# Patient Record
Sex: MALE | Race: BLACK OR AFRICAN AMERICAN | NOT HISPANIC OR LATINO | URBAN - METROPOLITAN AREA
[De-identification: names, ages, dates, MRNs, and addresses within clinical notes are randomized per-mention and may not be internally consistent; named-entity substitution may affect disease eponyms.]

---

## 2024-08-19 ENCOUNTER — TELEPHONE (OUTPATIENT)
Dept: BEHAVIORAL HEALTH | Facility: CLINIC | Age: 54
End: 2024-08-19

## 2024-08-19 NOTE — TELEPHONE ENCOUNTER
"Pt is a(n) adult (20+ out of HS) Seeking as eval for Adult Mental Health DA for Programmatic Care - Program Preference? Yes: Day Treatment ([place]) .  Appointment scheduled by:  Patient.  (self-pay - complete Cost Estimate)  Caller name:  Brittany Nicholson    Caller phone #: [phone]  Legal Guardianship Reviewed?  No  Honoring Choices Notified?  No  Brief reason for appt:  Patient is interested in the mental health day treatment program offered at [facility].      needed?  NO    Contact information verified/updated: Yes    If appt is for adult [facility] program location, confirm you have verified the location and address with the patient referring to the template header.  N/A    Valencia Bowden    \"We have scheduled your evaluation. In the event that your insurance coverage comes back as out of network, you may receive a call to cancel your appointment and direct you to your insurance company for in-network coverage.\"    Disclaimer regarding insurance read to patient?  Yes  Informed patient [place] are for programming that is in person in the [place] area?  Yes - proceed with scheduling   "

## 2024-08-27 ENCOUNTER — VIRTUAL VISIT (OUTPATIENT)
Dept: BEHAVIORAL HEALTH | Facility: CLINIC | Age: 54
End: 2024-08-27
Payer: COMMERCIAL

## 2024-08-27 DIAGNOSIS — F34.1 PERSISTENT DEPRESSIVE DISORDER: Primary | ICD-10-CM

## 2024-08-27 PROCEDURE — 99207 PR NO CHARGE LOS: CPT | Mod: 95

## 2024-08-27 RX ORDER — AMLODIPINE BESYLATE 10 MG/1
20 TABLET ORAL DAILY
COMMUNITY

## 2024-08-27 RX ORDER — BUPROPION HYDROCHLORIDE 150 MG/1
150 TABLET ORAL EVERY MORNING
COMMUNITY

## 2024-08-27 RX ORDER — LISINOPRIL 20 MG/1
20 TABLET ORAL DAILY
COMMUNITY

## 2024-08-27 ASSESSMENT — PATIENT HEALTH QUESTIONNAIRE - PHQ9
10. IF YOU CHECKED OFF ANY PROBLEMS, HOW DIFFICULT HAVE THESE PROBLEMS MADE IT FOR YOU TO DO YOUR WORK, TAKE CARE OF THINGS AT HOME, OR GET ALONG WITH OTHER PEOPLE: VERY DIFFICULT
SUM OF ALL RESPONSES TO PHQ QUESTIONS 1-9: 19
SUM OF ALL RESPONSES TO PHQ QUESTIONS 1-9: 19

## 2024-08-27 ASSESSMENT — ANXIETY QUESTIONNAIRES
4. TROUBLE RELAXING: MORE THAN HALF THE DAYS
3. WORRYING TOO MUCH ABOUT DIFFERENT THINGS: NEARLY EVERY DAY
GAD7 TOTAL SCORE: 12
GAD7 TOTAL SCORE: 12
7. FEELING AFRAID AS IF SOMETHING AWFUL MIGHT HAPPEN: SEVERAL DAYS
IF YOU CHECKED OFF ANY PROBLEMS ON THIS QUESTIONNAIRE, HOW DIFFICULT HAVE THESE PROBLEMS MADE IT FOR YOU TO DO YOUR WORK, TAKE CARE OF THINGS AT HOME, OR GET ALONG WITH OTHER PEOPLE: SOMEWHAT DIFFICULT
2. NOT BEING ABLE TO STOP OR CONTROL WORRYING: MORE THAN HALF THE DAYS
5. BEING SO RESTLESS THAT IT IS HARD TO SIT STILL: SEVERAL DAYS
GAD7 TOTAL SCORE: 12
7. FEELING AFRAID AS IF SOMETHING AWFUL MIGHT HAPPEN: SEVERAL DAYS
1. FEELING NERVOUS, ANXIOUS, OR ON EDGE: MORE THAN HALF THE DAYS
8. IF YOU CHECKED OFF ANY PROBLEMS, HOW DIFFICULT HAVE THESE MADE IT FOR YOU TO DO YOUR WORK, TAKE CARE OF THINGS AT HOME, OR GET ALONG WITH OTHER PEOPLE?: SOMEWHAT DIFFICULT
6. BECOMING EASILY ANNOYED OR IRRITABLE: SEVERAL DAYS

## 2024-08-27 NOTE — PROGRESS NOTES
"    [facility]         PATIENT'S NAME: Brittany Nicholson  PREFERRED NAME: Brittany  PRONOUNS:   he/him    MRN: [id number]  : 1970  ADDRESS: [address] [place] [address] [address] [place] [id number]  ACCT. NUMBER:  [id number]  DATE OF SERVICE: 24  START TIME: 1000  END TIME: 1115  75 minutes  PREFERRED PHONE: [phone]  May we leave a program related message: Yes  EMERGENCY CONTACT: was obtained Mikhail Dominguezun (Cousin)  [phone] (Mobile)   SERVICE MODALITY:  Video Visit:      Provider verified identity through the following two step process.  Patient provided:  Patient  and Patient address    Telemedicine Visit: The patient's condition can be safely assessed and treated via synchronous audio and visual telemedicine encounter.      Reason for Telemedicine Visit: Patient convenience (e.g. access to timely appointments / distance to available provider)    Originating Site (Patient Location): Patient's home    Distant Site (Provider Location): [facility] [place]    Consent:  The patient/guardian has verbally consented to: the potential risks and benefits of telemedicine (video visit) versus in person care; bill my insurance or make self-payment for services provided; and responsibility for payment of non-covered services.     Patient would like the video invitation sent by:  My Chart    Mode of Communication:  Video Conference via Amwell    Distant Location (Provider):  Off-site    As the provider I attest to compliance with applicable laws and regulations related to telemedicine.    UNIVERSAL ADULT Mental Health DIAGNOSTIC ASSESSMENT    Identifying Information:  Patient is a 54 year old,   individual.  Patient was referred for an assessment by    [facility].  Patient attended the session alone.    Chief Complaint:   The reason for seeking services at this time is: \"Depression\".  The problem(s) began 24. " "Pt has been growing in dissatisfaction over where he's \"at with my life. I wasn't satisfied with my job, I'm single, no relationship, no kids...\" Pt reported he began to isolate, and in July stopped taking his medications, which further exacerbated his depression. Pt reported he went to the casino, broke his sobriety and started drinking. Pt reported binge drinking for 3 days. As he was recovering from the binge, he began to experience SI and went to ER at [facility] in [facility] but transferred to [facility] where he stayed for one week. Pt was discharged with IOP referral. Pt reported he has been struggling with depression and experiencing increased SI. Of note, pt lost father and uncle through completed suicide. Pt denied that his drinking continued past     Patient has attempted to resolve these concerns in the past through medication, treatment, hospitalization .    Social/Family History:  Patient reported they grew up in [place]. They were raised by biological mother. Pt has one biological brother and two half siblings. Parents . Pt was [age] years old when his father completed suicide. Patient reported that their childhood was \"difficult being raised by a single mother. She had some support but we still struggled.\" Pt recalled being his younger brother's caretaker while his mother worked overnights. Patient described their current relationships with family of origin as Pt's mother passed seven years ago but pt remains close with his little brother.     The patient describes their cultural background as .  Cultural influences and impact on patient's life structure, values, norms, and healthcare: History of discrimination..  Contextual influences on patient's health include: Contextual Factors: Individual Factors genetic loading for depression and SI, Family Factors depression, death by suicide , and Health- Seeking Factors pt has access to and seeks care as needed .    " These factors will be addressed in the Preliminary Treatment plan. Patient identified their preferred language to be English. Patient reported they does not need the assistance of an  or other support involved in therapy.     Patient reported had no significant delays in developmental tasks.   Patient's highest education level was some college  .  Patient identified the following learning problems: none reported.  Modifications will not be used to assist communication in therapy.  Patient reports they are not  able to understand written materials.    Patient reported the following relationship history long-term relationships.  Patient's current relationship status is single for three years.   Patient identified their sexual orientation as heterosexual.  Patient reported having no child(javad). Patient identified friends, brother, as part of their support system.  Patient identified the quality of these relationships as inconsistent.     Patient's current living/housing situation involves staying in own home/apartment.  The immediate members of family and household include no one and they report that housing is stable.    Patient is currently on medical leave.  Patient reports their finances are obtained through employment. Patient does identify finances as a current stressor.      Patient reported that they have not been involved with the legal system. Patient does not report being under probation/ parole/ jurisdiction. They are not under any current court jurisdiction. .    Patient's Strengths and Limitations:  Patient identified the following strengths or resources that will help them succeed in treatment: commitment to health and well being, macario / spirituality, insight, intelligence, positive work environment, sober support group / recovery support , strong social skills, and work ethic. Things that may interfere with the patient's success in treatment include: lack of social support and none  identified.     Assessments:  The following assessments were completed by patient for this visit:  PHQ9:       8/27/2024     8:48 AM   PHQ-9 SCORE   PHQ-9 Total Score MyChart 19 (Moderately severe depression)   PHQ-9 Total Score 19     GAD7:       8/27/2024     9:12 AM   JESSICA-7 SCORE   Total Score 12 (moderate anxiety)   Total Score 12     CAGE-AID:       8/27/2024     9:17 AM   CAGE-AID Total Score   Total Score 4   Total Score MyChart 4 (A total score of 2 or greater is considered clinically significant)     PROMIS 10-Global Health (all questions and answers displayed):       8/27/2024     9:17 AM   PROMIS 10   In general, would you say your health is: Poor   In general, would you say your quality of life is: Fair   In general, how would you rate your physical health? Poor   In general, how would you rate your mental health, including your mood and your ability to think? Poor   In general, how would you rate your satisfaction with your social activities and relationships? Poor   In general, please rate how well you carry out your usual social activities and roles Poor   To what extent are you able to carry out your everyday physical activities such as walking, climbing stairs, carrying groceries, or moving a chair? A little   In the past 7 days, how often have you been bothered by emotional problems such as feeling anxious, depressed, or irritable? Often   In the past 7 days, how would you rate your fatigue on average? Moderate   In the past 7 days, how would you rate your pain on average, where 0 means no pain, and 10 means worst imaginable pain? 2   In general, would you say your health is: 1   In general, would you say your quality of life is: 2   In general, how would you rate your physical health? 1   In general, how would you rate your mental health, including your mood and your ability to think? 1   In general, how would you rate your satisfaction with your social activities and relationships? 1   In  general, please rate how well you carry out your usual social activities and roles. (This includes activities at home, at work and in your community, and responsibilities as a parent, child, spouse, employee, friend, etc.) 1   To what extent are you able to carry out your everyday physical activities such as walking, climbing stairs, carrying groceries, or moving a chair? 2   In the past 7 days, how often have you been bothered by emotional problems such as feeling anxious, depressed, or irritable? 4   In the past 7 days, how would you rate your fatigue on average? 3   In the past 7 days, how would you rate your pain on average, where 0 means no pain, and 10 means worst imaginable pain? 2   Global Mental Health Score 6   Global Physical Health Score 10   PROMIS TOTAL - SUBSCORES 16     [place] Suicide Severity Rating Scale (Lifetime/Recent)      2024     9:00 AM   [place] Suicide Severity Rating (Lifetime/Recent)   Q1 Wish to be Dead (Lifetime) Y   1. Wish to be Dead (Past 1 Month) Y   Q2 Non-Specific Active Suicidal Thoughts (Lifetime) N   Actual Attempt (Lifetime) N   Has subject engaged in non-suicidal self-injurious behavior? (Lifetime) N   Interrupted Attempts (Lifetime) N   Aborted or Self-Interrupted Attempt (Lifetime) N   Preparatory Acts or Behavior (Lifetime) N   Calculated C-SSRS Risk Score (Lifetime/Recent) Low Risk     LOCUS Worksheet:   LOCUS Worksheet     Name: Brittany Nicholson MRN: [id number]    : 1970      Gender:  male    PMI:  unknown Provider Name: Mima Hsu  Provider NPI:  [id number]    Actual level of Care Provided:  DA    Service(s) receiving or referred to:  IOP    Reason for Variance: NA      Rating completed by: Mima Hsu      I. Risk of Harm:   3      Moderate Risk of Harm    II. Functional Status:   4      Serious Impairment    III. Co-Morbidity:   3      Significant Co-Morbidity    IV - A. Recovery Environment - Level of Stress:   2      Mildly Stressful Environment    IV  - B. Recovery Environment - Level of Support:   3      Limited Support in Environment    V. Treatment and Recovery History:   1      Fully Responsive to Treatment and Recovery Management    VI. Engagement and Recovery Project:   1      Optimal Engagement and Recovery       17 Composite Score    Level of Care Recommendation:   17 to 19       High Intensity Community Based Services    Personal and Family Medical History:  Patient does report a family history of mental health concerns.  Pt reported extensive depression on both sides of the family with completed suicide by pt's father and uncle. Patient reports family history is not on file..     Patient does report Mental Health Diagnosis and/or Treatment.  Patient reported the following previous diagnoses which include(s): an anxiety disorder; depression .  Patient reported symptoms began July 2024.  Patient has received mental health services in the past:  therapy; partial hospitalization program. Psychiatric Hospitalizations: two when: [facility] (August 2024 for one week), [facility] in [place], [place] (Nov-Dec 2021, 60 days).  Patient denies a history of civil commitment.      Currently, patient none  is receiving other mental health services.  These include none.       Patient has had a physical exam to rule out medical causes for current symptoms.  Date of last physical exam was within the past year. Client was encouraged to follow up with PCP if symptoms were to develop. The patient has a [facility] Primary Care Provider, who is named Liban Echavarria.  Patient reports the following current medical concerns: obesity .  Patient denies any issues with pain. There are not significant appetite / nutritional concerns / weight changes. Patient does not report a history of head injury / trauma / cognitive impairment.     Patient reports current meds as:   Current Outpatient Medications   Medication Sig Dispense Refill    amLODIPine (NORVASC) 10 MG  tablet Take 20 mg by mouth daily. Two 10 mg tablets daily      buPROPion (WELLBUTRIN XL) 150 MG 24 hr tablet Take 150 mg by mouth every morning.      lisinopril (ZESTRIL) 20 MG tablet Take 20 mg by mouth daily.      metFORMIN (GLUCOPHAGE) 500 MG tablet Take 500 mg by mouth 2 times daily (with meals).       No current facility-administered medications for this visit.       Medication Adherence:  Patient reports not taking.  taking prescribed medications as prescribed.    Patient Allergies:  Not on File    Medical History:  No past medical history on file.      Current Mental Status Exam:   Appearance:  Appropriate    Eye Contact:  Good   Psychomotor:  Normal       Gait / station:  no problem  Attitude / Demeanor: Cooperative  Interested  Speech      Rate / Production: Normal/ Responsive      Volume:  Normal  volume      Language:  no problems  Mood:   Normal  Affect:   Appropriate    Thought Content: Clear   Thought Process: Coherent  Goal Directed       Associations: No loosening of associations  Insight:   Fair   Judgment:  Intact   Orientation:  All  Attention/concentration: Good    Substance Use:   Patient did report a family history of substance use concerns; see medical history section for details.  Patient has received chemical dependency treatment in the past at [facility] November-December 2020 .  Patient has not ever been to detox.  Pt reported he has had 9+ years of sobriety prior to tx in 2020. Pt reported return to use again two weeks ago, which was the catalyst to his reaching out for support. Pt believes depression is what motivates his use of substances     Patient is not currently receiving any chemical dependency treatment. Patient reported the following problems as a result of their substance use:   MA .    Patient denies using alcohol. Pt reported past diagnosis and tx for AUD; PT reported drinking binge apx 10 days ago, which prompted him to seek support.  Patient reports using tobacco  "one pack times per day. Client started using tobacco at age 20..  Patient reports using cannabis 3 times per month and smokes 1 at a time. Patient started using cannabis at age 14.  Patient reports last use was August 3, 2024.  Patient reports heaviest use was until 2021.  Patient reports using caffeine 3 times per week and drinks 1 at a time. Patient started using caffeine at age 8.  Patient reports using/abusing the following substance(s). Patient reported no other substance use.     Substance Use: No symptoms    Based on the positive CAGE score and clinical interview there  are indications of drug or alcohol abuse. Pt reported past concerns of substance use but considers the \"binge\" he engaged in a brief lapse, serving as his motivation to seek mental health support. 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     Significant Losses / Trauma / Abuse / Neglect Issues:   Patient did not serve in the .  There are indications or report of significant loss, trauma, abuse or neglect issues related to: are indications or report of significant loss, trauma, abuse or neglect issues related to and death of father by suicide, uncle by suicide, death of grandfather .  Concerns for possible neglect are not present.     Safety Assessment:   Patient denies current homicidal ideation and behaviors.  Patient denies current self-injurious ideation and behaviors.    Patient denied risk behaviors associated with substance use.   Patient reported substance use associated with mental health symptoms.  Patient reports the following current concerns for their personal safety: None.  Patient reports there are not firearms in the house.       There are no firearms in the home..    History of Safety Concerns:  Patient denied a history of " homicidal ideation.     Patient denied a history of personal safety concerns.    Patient denied a history of assaultive behaviors.    Patient denied a history of sexual assault behaviors.     Patient denied a history of risk behaviors associated with substance use.  Patient reported a history of substance use associated with mental health symptoms.  Patient reports the following protective factors: forward or future oriented thinking; dedication to family or friends    Risk Plan:  See Recommendations for Safety and Risk Management Plan    Review of Symptoms per patient report:   Depression: Change in sleep, Lack of interest, Excessive or inappropriate guilt, Change in energy level, Difficulties concentrating, Change in appetite, Psychomotor slowing or agitation, Suicidal ideation, Feelings of hopelessness, Feelings of helplessness, Low self-worth, Ruminations, Irritability, Feeling sad, down, or depressed, Withdrawn, Poor hygeine, and Anger outbursts  Elisha:  No Symptoms  Psychosis: No Symptoms  Anxiety: Separation anxiety, Sleep disturbance, Ruminations, Poor concentration, and Irritability  Panic:  Palpitations, Shortness of breath, and Sense of impending doom  Post Traumatic Stress Disorder:  Experienced traumatic event death of father - pt discovered his body, Reexperiencing of trauma, Increased arousal, and Impaired functioning   Eating Disorder: Binging  ADD / ADHD:  No symptoms  Conduct Disorder: No symptoms  Autism Spectrum Disorder: No symptoms  Obsessive Compulsive Disorder: No Symptoms    Patient reports the following compulsive behaviors and treatment history: Gambling - has not had treatment. Pt is considering getting an assessment for tx.      Diagnostic Criteria:   Persistent Depressive Disorder  A. Depressed mood for most of the day, for more days than not, as indicated either by subjective account or observation by others, for at least 2 years. Note: In children and adolescents, mood can be  irritable and duration must be at least 1 year.   B. Presence, while depressed, of two (or more) of the following:        - poor appetite or overeating        - insomnia or hypersomnia       - low energy or fatigue        - low self-esteem        - poor concentration or difficulty making decisions        - feelings of hopelessness   C. During the 2-year period (1 year for children or adolescents) of the disturbance, the person has never been without the symptoms in Criteria A and B for more than 2 months at a time.  D. Criteria for a major depressive disorder may be continously present for 2 years  E. There has never been a Manic Episode, a Mixed Episode, or a Hypomanic Episode, and criteria have never been met for Cyclothymic Disorder.   F. The disturbance is not better explained by a persistent schizoaffective disorder, schizophrenia, delusional disorder, or other specified or unspecified schizophrenia spectrum and other psychotic disorder  G. The symptoms are not attributable to the physiological effects of a substance (e.g., a drug of abuse, a medication) or another medical condition (e.g., hypothyroidism).   H. The symptoms cause clinically significant distress or impairment in social, occupational, or other important areas of functioning.        - Late Onset: if onset is age 21 years or older     Functional Status:  Patient reports the following functional impairments:  chronic disease management, management of the household and or completion of tasks, money management, organization, relationship(s), self-care, and social interactions.     Programmatic care:  Current LOCUS was assigned and patient needs the following level of care based on score 17  .    Clinical Summary:  1. Psychosocial, Cultural and Contextual Factors: 55 yo  male, lives alone, employed ft, hx of depression, recent SI, increase in unhelpful coping skills  2. Principal DSM5 Diagnoses  (Sustained by DSM5 Criteria Listed  Above):   300.4 (F34.1) Persistent Depressive Disorder, Late onset, With intermittent major depressive episodes, with current episodes, and Severe.  3. Other Diagnoses that is relevant to services:  Substance-Related & Addictive Disorders 291.9 (F10.99) Unspecified Alcohol Related Disorder as evidenced by brief return to use secondary to increased depression and SI. Pt   4. Provisional Diagnosis: NA  5. Prognosis: Expect Improvement and Relieve Acute Symptoms.  6. Likely consequences of symptoms if not treated: further deterioration.  7. Client strengths include:  committed to sobriety, educated, empathetic, employed, goal-focused, good listener, has a previous history of therapy, intelligent, open to learning, supportive, wants to learn, willing to ask questions, willing to relate to others, and work history .     Recommendations:     1. Plan for Safety and Risk Management:   Safety and Risk: A safety and risk management plan has been developed including: Patient consented to co-developed safety plan.  Safety and risk management plan was completed - see below.  Patient agreed to use safety plan should any safety concerns arise.  A copy was given to the patient..          Report to child / adult protection services was NA.     2. Patient's identified  no cultural concerns that need to be considered in tx .     3. Initial Treatment will focus on:    Depressed Mood - identify triggers, supports, increase positive coping .     4. Resources/Service Plan:    services are not indicated.   Modifications to assist communication are not indicated.   Additional disability accommodations are not indicated.      5. Collaboration:   Collaboration / coordination of treatment will be initiated with the following  support professionals:  NA .      6.  Referrals:   The following referral(s) will be initiated: Day Treatment.       A Release of Information has been obtained for the following:  NA .     Clinical  Substantiation/medical necessity for the above recommendations:  Pt is  male with prior dx of depression, PTSD, and AUD with treatment through medication management and one MICD residential tx in November-December 2020. PT endorsed gradual increase in depressive sxs, including SI, which is what prompted him to seek support. Also present is family hx of completed suicide by father and uncle. Writer initiated meaningful discussed with pt about the relevance of dual diagnosis IOP should problem use develop or continue. Pt remains firm that he needs to focus on mental health, as return to alcohol use was brief and is not the underlying problem. Pt meets full criteria for persistent depressive disorder, severe, late onset, with current major depressive episode. There is evidence of trauma, though additional clinical information and observation would be warranted to confirm this diagnosis. Current recommendation is for MH IOP, with consideration for dual IOP should problem alcohol use develop.    7. JOSE:    JOSE:  Discussed the general effects of drugs and alcohol on health and well-being. Provider gave patient printed information about the  effects of chemical use on their health and well being. Recommendations:  see above .     8. Records:   These were reviewed at time of assessment.   Information in this assessment was obtained from the medical record and  provided by patient who is a good historian.    Patient will have open access to their mental health medical record.    9.   Interactive Complexity: No    10. Safety Plan:       Provider Name/ Credentials:  Mima Hsu MA, [facility] on 8/28/2024 at 9:59 AM    August 27, 2024       Answers submitted by the patient for this visit:  Patient Health Questionnaire (Submitted on 8/27/2024)  If you checked off any problems, how difficult have these problems made it for you to do your work, take care of things at home, or get along with other people?: Very  difficult  PHQ9 TOTAL SCORE: 19  Patient Health Questionnaire (G7) (Submitted on 8/27/2024)  JESSICA 7 TOTAL SCORE: 12

## 2024-08-28 ENCOUNTER — TELEPHONE (OUTPATIENT)
Dept: BEHAVIORAL HEALTH | Facility: CLINIC | Age: 54
End: 2024-08-28

## 2024-08-28 PROBLEM — F34.1 PERSISTENT DEPRESSIVE DISORDER: Status: ACTIVE | Noted: 2024-08-28

## 2024-08-28 ASSESSMENT — COLUMBIA-SUICIDE SEVERITY RATING SCALE - C-SSRS
1. IN THE PAST MONTH, HAVE YOU WISHED YOU WERE DEAD OR WISHED YOU COULD GO TO SLEEP AND NOT WAKE UP?: YES
ATTEMPT LIFETIME: NO
1. HAVE YOU WISHED YOU WERE DEAD OR WISHED YOU COULD GO TO SLEEP AND NOT WAKE UP?: YES
2. HAVE YOU ACTUALLY HAD ANY THOUGHTS OF KILLING YOURSELF?: NO
TOTAL  NUMBER OF ABORTED OR SELF INTERRUPTED ATTEMPTS LIFETIME: NO
6. HAVE YOU EVER DONE ANYTHING, STARTED TO DO ANYTHING, OR PREPARED TO DO ANYTHING TO END YOUR LIFE?: NO
TOTAL  NUMBER OF INTERRUPTED ATTEMPTS LIFETIME: NO

## 2024-08-28 NOTE — TELEPHONE ENCOUNTER
Summary of Patient Care Communication Handoff to Patient Navigator Coordinator    PATIENT'S NAME: Brittany Nicholson  MRN:   [id number]  :   1970    DATE OF SERVICE: 24    Referral Needed: Yes    Is the patient coming from an inpatient unit? No    What program is this referral for? Adult Mental Health Referral    Level of Care Recommended:  Combined Intensive Outpatient Day Treatment Program (IOP-ADT) / Adult Day Treatment Program (ADT)    Specialty Track Recommendations:   Specialty Tracks: Trauma / Personality Disorder and General Mood Disorder    Schedule Preferences: Schedule Preference: Schedule Restraints Known: ?    Are there any potential barriers for entrance into programmatic care? Pt has prior hx of alcohol use disorder with brief return to use two weeks ago. He reported this as an isolated incident but it should be noted as a relevant event/stressor.     Followed up from  Needed?:  Yes: inquire if pt is interested in resources related to gambling addiction    Mental Health Referral Needed: Yes: long term therapy for depression and trauma    Release of Information Needed:  TROY Needed: Other:  NA    Faxing Needed: No    Follow up Requests:  Patient Navigator Coordinator Follow-Up Needed: Referral to designated [facility]. and Other:  see above    Comments: Thanks for all you do!    Mima Hsu, [facility], [facility]        Patient Navigator Coordinator Contact Information  Pool Message: [email or web address] ([id number])   Phone:  [phone]  Fax:  [phone]  Email:  [email or web address]

## 2024-08-28 NOTE — TELEPHONE ENCOUNTER
**Patient Navigator Follow Up**    8/28/2024;    Referral for IOP-ADT;    Specialty Track Recommendations:   Specialty Tracks: Trauma / Personality Disorder and General Mood Disorder    Are there any potential barriers for entrance into programmatic care? Pt has prior hx of alcohol use disorder with brief return to use two weeks ago. He reported this as an isolated incident but it should be noted as a relevant event/stressor.      **I consulted with  Programming Supervisor (Marybel Dinh), she responded:  as long as he understands this isn't JOSE and if we think that will be a better fit we would refer him out.      Followed up from  Needed?:  Yes: inquire if pt is interested in resources related to gambling addiction    **I sent referral form to Maddie BRIGHT ([place]) and she will follow up and provide gambling addiction resources accordingly.          I called and discussed  IOP programming options with patient.    He states he would take the morning track openings.    Patient is starting in IOP-2 Mornings on Tuesday, 9/3.    I added him to the census and sent in basket message to program as well.    I sent patient Welcome Letter through [employer].      Thank you,    Edna HYMAN  Patient Navigator

## 2024-08-29 ENCOUNTER — TELEPHONE (OUTPATIENT)
Dept: BEHAVIORAL HEALTH | Facility: CLINIC | Age: 54
End: 2024-08-29

## 2024-08-29 NOTE — TELEPHONE ENCOUNTER
----- Message from Edna H sent at 8/28/2024  2:05 PM CDT -----  Regarding: Referral for IOP-2  Adult Mental Health Programmatic Care Schedule Request    Patient Name: Brittany Nicholson  Location of programming: [facility]  Start Date: 9/3/2024    Group/PROVIDER: ADMISSION IOP AM TRACK [[id number]]   Appt Time: 12pm   Duration of Appointment in minutes: 60 minutes   Visit Type: Treatment [870]   Attending Provider: Cody Fisher     Adult Program Group: Adult Program Group: IOP 2 General Mood Track [[id number]]  Schedule:  M, T, Th, F 9am- 12noon  12 hours per week for 9 weeks  Number of visits to be scheduled: 36 days    Attending Provider (MD):  Dr. Cody Fisher ([place])  Visit Type:  In-Person    Accommodations Needed: No  Alerts Identified/Substantiation: No  Consulted with Supervisor: Yes, Marybel.

## 2024-09-05 ENCOUNTER — TELEPHONE (OUTPATIENT)
Dept: BEHAVIORAL HEALTH | Facility: CLINIC | Age: 54
End: 2024-09-05

## 2024-09-05 NOTE — TELEPHONE ENCOUNTER
----- Message from Flavio TIMMONS sent at 9/5/2024  8:45 AM CDT -----  Regarding: RE: Referral for IOP-2  Please cancel program appts; pt will not be starting at this time.  Thank you.  ----- Message -----  From: Edna Jimenez  Sent: 8/28/2024   2:07 PM CDT  To: Teresa Guzmán [facility]; Marybel Dinh, [facility]; #  Subject: Referral for IOP-2                               Adult Mental Health Programmatic Care Schedule Request    Patient Name: Brittany Nicholson  Location of programming: [facility]  Start Date: 9/3/2024    Group/PROVIDER: ADMISSION IOP AM TRACK [[id number]]   Appt Time: 12pm   Duration of Appointment in minutes: 60 minutes   Visit Type: Treatment [870]   Attending Provider: Cody Fisher     Adult Program Group: Adult Program Group: IOP 2 General Mood Track [[id number]]  Schedule:  M, T, Th, F 9am- 12noon  12 hours per week for 9 weeks  Number of visits to be scheduled: 36 days    Attending Provider (MD):  Dr. Cody Fisher ([place])  Visit Type:  In-Person    Accommodations Needed: No  Alerts Identified/Substantiation: No  Consulted with Supervisor: Yes, Marybel.

## 2024-09-05 NOTE — TELEPHONE ENCOUNTER
----- Message from Flavio TIMMONS sent at 9/5/2024  8:45 AM CDT -----  Regarding: RE: Referral for IOP-2  Please cancel program appts; pt will not be starting at this time.  Thank you.  ----- Message -----  From: Edna Jimenez  Sent: 8/28/2024   2:07 PM CDT  To: Teresa Guzmán [facility]; Marybel Dinh, [facility]; #  Subject: Referral for IOP-2                               Adult Mental Health Programmatic Care Schedule Request    Patient Name: Brittany Nicholson  Location of programming: [facility]  Start Date: 9/3/2024    Group/PROVIDER: ADMISSION IOP AM TRACK [[id number]]   Appt Time: 12pm   Duration of Appointment in minutes: 60 minutes   Visit Type: Treatment [870]   Attending Provider: Cody Fisher     Adult Program Group: Adult Program Group: IOP 2 General Mood Track [[id number]]  Schedule:  M, T, Th, F 9am- 12noon  12 hours per week for 9 weeks  Number of visits to be scheduled: 36 days    Attending Provider (MD):  Dr. Cody Fisher ([place])  Visit Type:  In-Person    Accommodations Needed: No  Alerts Identified/Substantiation: No  Consulted with Supervisor: Yes, Marybel.

## 2024-09-05 NOTE — TELEPHONE ENCOUNTER
----- Message from Flavio TIMMONS sent at 9/5/2024  8:45 AM CDT -----  Regarding: RE: Referral for IOP-2  Please cancel program appts; pt will not be starting at this time.  Thank you.  ----- Message -----  From: Edna Jimenez  Sent: 8/28/2024   2:07 PM CDT  To: Teresa Guzmán [facility]; Marybel Dinh, [facility]; #  Subject: Referral for IOP-2                               Adult Mental Health Programmatic Care Schedule Request    Patient Name: Brittany Nicholson  Location of programming: [facility]  Start Date: 9/3/2024    Group/PROVIDER: ADMISSION IOP AM TRACK [[id number]]   Appt Time: 12pm   Duration of Appointment in minutes: 60 minutes   Visit Type: Treatment [870]   Attending Provider: Cody Fisher     Adult Program Group: Adult Program Group: IOP 2 General Mood Track [[id number]]  Schedule:  M, T, Th, F 9am- 12noon  12 hours per week for 9 weeks  Number of visits to be scheduled: 36 days    Attending Provider (MD):  Dr. Cody Fisher ([place])  Visit Type:  In-Person    Accommodations Needed: No  Alerts Identified/Substantiation: No  Consulted with Supervisor: Yes, Marybel.

## 2024-09-07 ENCOUNTER — HEALTH MAINTENANCE LETTER (OUTPATIENT)
Age: 54
End: 2024-09-07